# Patient Record
Sex: MALE | Race: WHITE | NOT HISPANIC OR LATINO | URBAN - METROPOLITAN AREA
[De-identification: names, ages, dates, MRNs, and addresses within clinical notes are randomized per-mention and may not be internally consistent; named-entity substitution may affect disease eponyms.]

---

## 2018-02-16 ENCOUNTER — EMERGENCY (EMERGENCY)
Facility: HOSPITAL | Age: 73
LOS: 1 days | End: 2018-02-16
Payer: MEDICARE

## 2018-02-16 PROCEDURE — 71045 X-RAY EXAM CHEST 1 VIEW: CPT | Mod: 26

## 2018-02-16 PROCEDURE — 99285 EMERGENCY DEPT VISIT HI MDM: CPT

## 2018-02-17 PROCEDURE — 74177 CT ABD & PELVIS W/CONTRAST: CPT | Mod: 26

## 2018-02-17 PROCEDURE — 76705 ECHO EXAM OF ABDOMEN: CPT | Mod: 26

## 2018-04-09 ENCOUNTER — TRANSCRIPTION ENCOUNTER (OUTPATIENT)
Age: 73
End: 2018-04-09

## 2018-05-11 ENCOUNTER — OUTPATIENT (OUTPATIENT)
Dept: OUTPATIENT SERVICES | Facility: HOSPITAL | Age: 73
LOS: 1 days | End: 2018-05-11
Payer: MEDICARE

## 2018-05-11 PROCEDURE — 76700 US EXAM ABDOM COMPLETE: CPT | Mod: 26

## 2018-09-07 ENCOUNTER — EMERGENCY (EMERGENCY)
Facility: HOSPITAL | Age: 73
LOS: 1 days | End: 2018-09-07
Payer: MEDICARE

## 2018-09-07 PROCEDURE — 70450 CT HEAD/BRAIN W/O DYE: CPT | Mod: 26

## 2018-09-07 PROCEDURE — 99285 EMERGENCY DEPT VISIT HI MDM: CPT

## 2018-09-07 PROCEDURE — 71045 X-RAY EXAM CHEST 1 VIEW: CPT | Mod: 26

## 2018-09-26 ENCOUNTER — APPOINTMENT (OUTPATIENT)
Dept: CARDIOLOGY | Facility: CLINIC | Age: 73
End: 2018-09-26
Payer: MEDICARE

## 2018-09-26 VITALS
SYSTOLIC BLOOD PRESSURE: 132 MMHG | DIASTOLIC BLOOD PRESSURE: 70 MMHG | WEIGHT: 230 LBS | HEART RATE: 90 BPM | HEIGHT: 70 IN | OXYGEN SATURATION: 98 % | BODY MASS INDEX: 32.93 KG/M2

## 2018-09-26 DIAGNOSIS — Z87.891 PERSONAL HISTORY OF NICOTINE DEPENDENCE: ICD-10-CM

## 2018-09-26 DIAGNOSIS — Z82.49 FAMILY HISTORY OF ISCHEMIC HEART DISEASE AND OTHER DISEASES OF THE CIRCULATORY SYSTEM: ICD-10-CM

## 2018-09-26 DIAGNOSIS — K29.70 GASTRITIS, UNSPECIFIED, W/OUT BLEEDING: ICD-10-CM

## 2018-09-26 DIAGNOSIS — Z78.9 OTHER SPECIFIED HEALTH STATUS: ICD-10-CM

## 2018-09-26 DIAGNOSIS — R52 PAIN, UNSPECIFIED: ICD-10-CM

## 2018-09-26 DIAGNOSIS — Z00.00 ENCOUNTER FOR GENERAL ADULT MEDICAL EXAMINATION W/OUT ABNORMAL FINDINGS: ICD-10-CM

## 2018-09-26 PROCEDURE — 99205 OFFICE O/P NEW HI 60 MIN: CPT

## 2018-09-26 RX ORDER — DICYCLOMINE HYDROCHLORIDE 10 MG/1
10 CAPSULE ORAL DAILY
Refills: 0 | Status: ACTIVE | COMMUNITY

## 2018-10-24 ENCOUNTER — OUTPATIENT (OUTPATIENT)
Dept: OUTPATIENT SERVICES | Facility: HOSPITAL | Age: 73
LOS: 1 days | End: 2018-10-24

## 2021-09-15 ENCOUNTER — APPOINTMENT (OUTPATIENT)
Dept: CT IMAGING | Facility: CLINIC | Age: 76
End: 2021-09-15
Payer: MEDICARE

## 2021-09-15 PROCEDURE — 71250 CT THORAX DX C-: CPT | Mod: MH

## 2022-03-17 ENCOUNTER — OUTPATIENT (OUTPATIENT)
Dept: OUTPATIENT SERVICES | Facility: HOSPITAL | Age: 77
LOS: 1 days | End: 2022-03-17

## 2022-03-17 DIAGNOSIS — E78.5 HYPERLIPIDEMIA, UNSPECIFIED: ICD-10-CM

## 2022-03-17 DIAGNOSIS — R53.83 OTHER FATIGUE: ICD-10-CM

## 2022-03-17 DIAGNOSIS — Z13.1 ENCOUNTER FOR SCREENING FOR DIABETES MELLITUS: ICD-10-CM

## 2022-03-29 ENCOUNTER — APPOINTMENT (OUTPATIENT)
Dept: CARDIOLOGY | Facility: CLINIC | Age: 77
End: 2022-03-29
Payer: MEDICARE

## 2022-03-29 VITALS
OXYGEN SATURATION: 99 % | SYSTOLIC BLOOD PRESSURE: 130 MMHG | HEIGHT: 70 IN | WEIGHT: 228 LBS | TEMPERATURE: 97.3 F | HEART RATE: 67 BPM | DIASTOLIC BLOOD PRESSURE: 68 MMHG | BODY MASS INDEX: 32.64 KG/M2

## 2022-03-29 DIAGNOSIS — R07.89 OTHER CHEST PAIN: ICD-10-CM

## 2022-03-29 DIAGNOSIS — M79.603 PAIN IN ARM, UNSPECIFIED: ICD-10-CM

## 2022-03-29 DIAGNOSIS — R06.02 SHORTNESS OF BREATH: ICD-10-CM

## 2022-03-29 PROCEDURE — 93000 ELECTROCARDIOGRAM COMPLETE: CPT

## 2022-03-29 PROCEDURE — 99204 OFFICE O/P NEW MOD 45 MIN: CPT

## 2022-03-29 NOTE — REASON FOR VISIT
[FreeTextEntry1] : The patient is seen because of shortness of breath.  The patient has a history of bladder cancer as well as significant COPD.  The patient smoked for approximately 120 pack years.  He mostly uses a wheelchair but he can walk short distances.  He is dyspneic with minimal exertion.  The patient has been told of significant asthma in the past.  The patient has a chest discomfort syndrome.  He cannot describe it.  There is no radiation.  It last for a few minutes and then resolves.  Is been going on for about 2 years without change.

## 2022-03-29 NOTE — ASSESSMENT
[FreeTextEntry1] : COPD/shortness of breath/chest discomfort: We will obtain echocardiography.  After the results of echocardiography are available we will review the risks and benefits of invasive coronary angiography.  Given the nearly wheelchair-bound status, advanced age, and significant asthma overall do not think that stress testing is his best option.  We will decide on further management after transthoracic echocardiography.

## 2022-04-04 PROBLEM — M79.603 ARM PAIN: Status: ACTIVE | Noted: 2022-03-29

## 2022-04-04 PROBLEM — R07.89 BURNING IN THE CHEST: Status: ACTIVE | Noted: 2022-03-29

## 2022-04-04 PROBLEM — R06.02 SHORTNESS OF BREATH ON EXERTION: Status: ACTIVE | Noted: 2022-03-29

## 2022-04-18 ENCOUNTER — RESULT CHARGE (OUTPATIENT)
Age: 77
End: 2022-04-18

## 2022-04-19 ENCOUNTER — APPOINTMENT (OUTPATIENT)
Dept: CARDIOLOGY | Facility: CLINIC | Age: 77
End: 2022-04-19
Payer: MEDICARE

## 2022-04-19 ENCOUNTER — NON-APPOINTMENT (OUTPATIENT)
Age: 77
End: 2022-04-19

## 2022-04-19 VITALS
OXYGEN SATURATION: 94 % | HEIGHT: 70 IN | TEMPERATURE: 97 F | HEART RATE: 104 BPM | DIASTOLIC BLOOD PRESSURE: 70 MMHG | SYSTOLIC BLOOD PRESSURE: 120 MMHG | WEIGHT: 228 LBS | BODY MASS INDEX: 32.64 KG/M2

## 2022-04-19 DIAGNOSIS — R06.00 DYSPNEA, UNSPECIFIED: ICD-10-CM

## 2022-04-19 DIAGNOSIS — R00.0 TACHYCARDIA, UNSPECIFIED: ICD-10-CM

## 2022-04-19 PROCEDURE — 93306 TTE W/DOPPLER COMPLETE: CPT

## 2022-04-19 PROCEDURE — 99213 OFFICE O/P EST LOW 20 MIN: CPT

## 2022-04-19 NOTE — HISTORY OF PRESENT ILLNESS
[FreeTextEntry1] : Shortness of breath /COPD: The patient is approximately 120-year pack year cigarette smoking history.  He is short of breath.  He can only walk minimal distances.  He mostly uses a wheelchair.  He has also been told of significant asthma.  Echocardiography reveals a hyperdynamic LV.  The risk and benefit of invasive evaluation have been reviewed.  Patient does not wish to undergo any further procedures.  \par \par Tachycardia: The patient is in sinus tachycardia.  Most likely this is from severe underlying lung disease.  Continued observation is his best option.\par \par CBC and TSH were normal about a month ago.

## 2022-11-11 ENCOUNTER — NON-APPOINTMENT (OUTPATIENT)
Age: 77
End: 2022-11-11

## 2022-11-11 ENCOUNTER — APPOINTMENT (OUTPATIENT)
Dept: OPHTHALMOLOGY | Facility: CLINIC | Age: 77
End: 2022-11-11
Payer: MEDICARE

## 2022-11-11 PROCEDURE — 92002 INTRM OPH EXAM NEW PATIENT: CPT

## 2022-12-15 ENCOUNTER — NON-APPOINTMENT (OUTPATIENT)
Age: 77
End: 2022-12-15

## 2022-12-15 ENCOUNTER — APPOINTMENT (OUTPATIENT)
Dept: OPHTHALMOLOGY | Facility: CLINIC | Age: 77
End: 2022-12-15

## 2023-01-11 ENCOUNTER — NON-APPOINTMENT (OUTPATIENT)
Age: 78
End: 2023-01-11

## 2023-01-11 ENCOUNTER — APPOINTMENT (OUTPATIENT)
Dept: OPHTHALMOLOGY | Facility: CLINIC | Age: 78
End: 2023-01-11
Payer: MEDICARE

## 2023-01-11 PROCEDURE — 99203 OFFICE O/P NEW LOW 30 MIN: CPT

## 2023-02-16 ENCOUNTER — APPOINTMENT (OUTPATIENT)
Dept: OPHTHALMOLOGY | Facility: CLINIC | Age: 78
End: 2023-02-16
Payer: MEDICARE

## 2023-02-16 ENCOUNTER — NON-APPOINTMENT (OUTPATIENT)
Age: 78
End: 2023-02-16

## 2023-02-16 PROCEDURE — 92014 COMPRE OPH EXAM EST PT 1/>: CPT

## 2023-04-18 ENCOUNTER — APPOINTMENT (OUTPATIENT)
Dept: CT IMAGING | Facility: CLINIC | Age: 78
End: 2023-04-18
Payer: MEDICARE

## 2023-04-18 PROCEDURE — 71250 CT THORAX DX C-: CPT | Mod: MH

## 2023-05-26 ENCOUNTER — APPOINTMENT (OUTPATIENT)
Dept: CARDIOLOGY | Facility: CLINIC | Age: 78
End: 2023-05-26
Payer: MEDICARE

## 2023-05-26 VITALS
HEART RATE: 92 BPM | DIASTOLIC BLOOD PRESSURE: 70 MMHG | WEIGHT: 230 LBS | OXYGEN SATURATION: 96 % | HEIGHT: 70 IN | SYSTOLIC BLOOD PRESSURE: 146 MMHG | BODY MASS INDEX: 32.93 KG/M2

## 2023-05-26 PROCEDURE — 99213 OFFICE O/P EST LOW 20 MIN: CPT

## 2023-05-26 RX ORDER — ESOMEPRAZOLE MAGNESIUM 20 MG/1
20 GRANULE, DELAYED RELEASE ORAL TWICE DAILY
Refills: 0 | Status: DISCONTINUED | COMMUNITY
End: 2023-05-26

## 2023-05-26 RX ORDER — UMECLIDINIUM BROMIDE AND VILANTEROL TRIFENATATE 62.5; 25 UG/1; UG/1
62.5-25 POWDER RESPIRATORY (INHALATION)
Refills: 0 | Status: DISCONTINUED | COMMUNITY
End: 2023-05-26

## 2023-05-26 NOTE — ASSESSMENT
[FreeTextEntry1] : Ankle edema: The patient was advised to cardiology because of ankle edema.  Is been going on for about 3 years without change.  There is no pain.  It is bilateral nature.  The patient is mostly in a wheelchair.  The patient suffers from dependent edema.  Noninvasive measures have been reviewed.  Is not bothersome to him overall we have elected to withhold diuretic therapy at this time but if it is uncomfortable for him we will add diuretic therapy.\par \par COPD: The patient is on home oxygen.  The patient exhibits pursed lip breathing.  The risk and benefits of further cardiac evaluation have been reviewed.  Overall the patient does not wish to pursue cardiac evaluation at this time.

## 2023-05-30 ENCOUNTER — APPOINTMENT (OUTPATIENT)
Dept: ULTRASOUND IMAGING | Facility: CLINIC | Age: 78
End: 2023-05-30
Payer: MEDICARE

## 2023-05-30 PROCEDURE — 93970 EXTREMITY STUDY: CPT

## 2023-08-07 ENCOUNTER — APPOINTMENT (OUTPATIENT)
Dept: RADIOLOGY | Facility: CLINIC | Age: 78
End: 2023-08-07
Payer: MEDICARE

## 2023-08-07 ENCOUNTER — APPOINTMENT (OUTPATIENT)
Dept: ULTRASOUND IMAGING | Facility: CLINIC | Age: 78
End: 2023-08-07

## 2023-08-07 PROCEDURE — 73030 X-RAY EXAM OF SHOULDER: CPT | Mod: RT

## 2023-08-07 PROCEDURE — 76870 US EXAM SCROTUM: CPT

## 2023-11-22 ENCOUNTER — APPOINTMENT (OUTPATIENT)
Dept: CARDIOLOGY | Facility: CLINIC | Age: 78
End: 2023-11-22
Payer: MEDICARE

## 2023-11-22 VITALS
WEIGHT: 230 LBS | BODY MASS INDEX: 33 KG/M2 | SYSTOLIC BLOOD PRESSURE: 126 MMHG | DIASTOLIC BLOOD PRESSURE: 56 MMHG | HEART RATE: 79 BPM | OXYGEN SATURATION: 97 %

## 2023-11-22 DIAGNOSIS — M25.473 EFFUSION, UNSPECIFIED ANKLE: ICD-10-CM

## 2023-11-22 PROCEDURE — 99212 OFFICE O/P EST SF 10 MIN: CPT

## 2024-01-05 ENCOUNTER — APPOINTMENT (OUTPATIENT)
Dept: CARDIOLOGY | Facility: CLINIC | Age: 79
End: 2024-01-05
Payer: MEDICARE

## 2024-01-05 ENCOUNTER — NON-APPOINTMENT (OUTPATIENT)
Age: 79
End: 2024-01-05

## 2024-01-05 VITALS
SYSTOLIC BLOOD PRESSURE: 112 MMHG | DIASTOLIC BLOOD PRESSURE: 58 MMHG | WEIGHT: 230 LBS | HEART RATE: 75 BPM | HEIGHT: 70 IN | BODY MASS INDEX: 32.93 KG/M2 | OXYGEN SATURATION: 95 %

## 2024-01-05 PROCEDURE — 99215 OFFICE O/P EST HI 40 MIN: CPT

## 2024-01-05 RX ORDER — CHOLECALCIFEROL (VITAMIN D3) 125 MCG
TABLET ORAL
Refills: 0 | Status: ACTIVE | COMMUNITY

## 2024-01-05 RX ORDER — ASPIRIN 81 MG
81 TABLET, DELAYED RELEASE (ENTERIC COATED) ORAL
Refills: 0 | Status: ACTIVE | COMMUNITY

## 2024-01-05 RX ORDER — OMEPRAZOLE 40 MG/1
40 CAPSULE, DELAYED RELEASE ORAL
Refills: 0 | Status: ACTIVE | COMMUNITY

## 2024-01-05 RX ORDER — ROSUVASTATIN CALCIUM 20 MG/1
20 TABLET, FILM COATED ORAL
Refills: 0 | Status: ACTIVE | COMMUNITY

## 2024-01-05 NOTE — REASON FOR VISIT
[FreeTextEntry1] :    The patient is seen because of chest pain.  The patient has a history of 50 years of smoking 2-1/2 packs a day.  He discontinued cigarettes several years ago.  The patient is mostly wheelchair-bound.  He has severe COPD.  He is frequently dyspneic.  About 2 weeks ago the patient developed a chest pain syndrome.  Describes an uncomfortable feeling in the left side of his chest.  It moved around to the right side of his chest and now is back to the left side of his chest.  It is nearly continuous in nature.  He believes his breathing may have gotten slightly worse recently.

## 2024-01-05 NOTE — ASSESSMENT
[FreeTextEntry1] :   The patient has acute chest pain syndrome consistent with an acute ischemic syndrome.  This acute illness poses threat to life.    Decision regarding hospitalization: I have recommended emergency room evaluation to rule out myocardial infarction.  The patient already is on aspirin therapy.  The patient has a history of significant asthma.  He has been asthmatic since he was a child.  He is already on aspirin therapy.  Will withhold beta-blocker therapy.  The patient has been advised to report to the hospital as soon as he is agreeable.  He is at this time and not agreeable to hospitalization.  Transthoracic echocardiography has been arranged to rule out pericardial disease.  The risk and benefits of invasive coronary angiography have been reviewed.  The patient does not wish to proceed with invasive coronary angiography.

## 2024-01-11 ENCOUNTER — APPOINTMENT (OUTPATIENT)
Dept: CARDIOLOGY | Facility: CLINIC | Age: 79
End: 2024-01-11
Payer: MEDICARE

## 2024-01-11 PROCEDURE — 93306 TTE W/DOPPLER COMPLETE: CPT

## 2024-01-18 ENCOUNTER — APPOINTMENT (OUTPATIENT)
Dept: CARDIOLOGY | Facility: CLINIC | Age: 79
End: 2024-01-18
Payer: MEDICARE

## 2024-01-18 VITALS
DIASTOLIC BLOOD PRESSURE: 60 MMHG | BODY MASS INDEX: 32.93 KG/M2 | OXYGEN SATURATION: 95 % | WEIGHT: 230 LBS | HEIGHT: 70 IN | SYSTOLIC BLOOD PRESSURE: 124 MMHG

## 2024-01-18 DIAGNOSIS — R07.9 CHEST PAIN, UNSPECIFIED: ICD-10-CM

## 2024-01-18 DIAGNOSIS — Z86.39 PERSONAL HISTORY OF OTHER ENDOCRINE, NUTRITIONAL AND METABOLIC DISEASE: ICD-10-CM

## 2024-01-18 DIAGNOSIS — J44.9 CHRONIC OBSTRUCTIVE PULMONARY DISEASE, UNSPECIFIED: ICD-10-CM

## 2024-01-18 PROCEDURE — 99214 OFFICE O/P EST MOD 30 MIN: CPT

## 2024-01-18 RX ORDER — ALBUTEROL SULFATE 90 UG/1
108 (90 BASE) INHALANT RESPIRATORY (INHALATION)
Refills: 0 | Status: ACTIVE | COMMUNITY

## 2024-01-18 RX ORDER — ATORVASTATIN CALCIUM 10 MG/1
10 TABLET, FILM COATED ORAL
Qty: 90 | Refills: 3 | Status: ACTIVE | COMMUNITY
Start: 2024-01-18 | End: 1900-01-01

## 2024-01-18 RX ORDER — BUDESONIDE, GLYCOPYRROLATE, AND FORMOTEROL FUMARATE 160; 9; 4.8 UG/1; UG/1; UG/1
160-9-4.8 AEROSOL, METERED RESPIRATORY (INHALATION)
Refills: 0 | Status: ACTIVE | COMMUNITY

## 2024-01-18 NOTE — REASON FOR VISIT
[FreeTextEntry1] :   The patient is seen for follow-up of chest discomfort syndrome.  The patient self discontinued his rosuvastatin.  His chest discomfort and likely muscle pain completely resolved.  The patient is in a wheelchair.  There has been no further chest discomfort.  He has chronic shortness of breath to be secondary to COPD.

## 2024-01-18 NOTE — ASSESSMENT
[FreeTextEntry1] : Hyperlipidemia: The patient was on rosuvastatin.  He felt it may be giving him myalgias.  He discontinued rosuvastatin and his muscle pain including his chest discomfort resolved.  Overall we elected to attempt a atorvastatin 10 mg daily.  LFTs and cholesterol profile have been arranged in 6 weeks time.  COPD: Patient is a former long-term cigarette smoker.  He suffers from COPD.  It is possible that some of his chest discomfort was secondary to COPD.   Continue albuterol therapy.  Chest discomfort: No recurrence.  I have recommended continuation of baby aspirin therapy.  The patient has been instructed to call 911 for any chest discomfort lasting over 15 minutes.  The plan will be to consider invasive coronary angiography or CT coronary angiography if his symptoms recur.  The patient does not wish to undergo any further cardiac imaging at this time.

## 2024-01-24 ENCOUNTER — LABORATORY RESULT (OUTPATIENT)
Age: 79
End: 2024-01-24

## 2024-02-15 ENCOUNTER — APPOINTMENT (OUTPATIENT)
Dept: OPHTHALMOLOGY | Facility: CLINIC | Age: 79
End: 2024-02-15
Payer: MEDICARE

## 2024-02-15 ENCOUNTER — NON-APPOINTMENT (OUTPATIENT)
Age: 79
End: 2024-02-15

## 2024-02-15 PROCEDURE — 92014 COMPRE OPH EXAM EST PT 1/>: CPT

## 2024-06-25 ENCOUNTER — APPOINTMENT (OUTPATIENT)
Dept: UROLOGY | Facility: CLINIC | Age: 79
End: 2024-06-25
Payer: MEDICARE

## 2024-06-25 VITALS
WEIGHT: 230 LBS | SYSTOLIC BLOOD PRESSURE: 176 MMHG | HEIGHT: 70 IN | TEMPERATURE: 98.2 F | BODY MASS INDEX: 32.93 KG/M2 | HEART RATE: 116 BPM | DIASTOLIC BLOOD PRESSURE: 89 MMHG

## 2024-06-25 DIAGNOSIS — N39.0 URINARY TRACT INFECTION, SITE NOT SPECIFIED: ICD-10-CM

## 2024-06-25 PROCEDURE — 99203 OFFICE O/P NEW LOW 30 MIN: CPT

## 2024-07-15 ENCOUNTER — APPOINTMENT (OUTPATIENT)
Dept: CARDIOLOGY | Facility: CLINIC | Age: 79
End: 2024-07-15
Payer: MEDICARE

## 2024-07-15 VITALS — HEART RATE: 93 BPM | SYSTOLIC BLOOD PRESSURE: 134 MMHG | OXYGEN SATURATION: 94 % | DIASTOLIC BLOOD PRESSURE: 58 MMHG

## 2024-07-15 DIAGNOSIS — R07.9 CHEST PAIN, UNSPECIFIED: ICD-10-CM

## 2024-07-15 PROCEDURE — 99214 OFFICE O/P EST MOD 30 MIN: CPT

## 2024-08-01 ENCOUNTER — APPOINTMENT (OUTPATIENT)
Dept: UROLOGY | Facility: CLINIC | Age: 79
End: 2024-08-01
Payer: MEDICARE

## 2024-08-01 VITALS
HEART RATE: 103 BPM | BODY MASS INDEX: 32.93 KG/M2 | SYSTOLIC BLOOD PRESSURE: 151 MMHG | DIASTOLIC BLOOD PRESSURE: 72 MMHG | WEIGHT: 230 LBS | TEMPERATURE: 98.4 F | HEIGHT: 70 IN

## 2024-08-01 DIAGNOSIS — N39.0 URINARY TRACT INFECTION, SITE NOT SPECIFIED: ICD-10-CM

## 2024-08-01 PROCEDURE — 99212 OFFICE O/P EST SF 10 MIN: CPT

## 2024-08-01 NOTE — LETTER BODY
[Dear  ___] : Dear  [unfilled], [Consult Letter:] : I had the pleasure of evaluating your patient, [unfilled]. [Please see my note below.] : Please see my note below. [Consult Closing:] : Thank you very much for allowing me to participate in the care of this patient.  If you have any questions, please do not hesitate to contact me. [Sincerely,] : Sincerely, [FreeTextEntry3] : Irvin Sandhu, DO Genitourinary Medicine

## 2024-08-01 NOTE — ASSESSMENT
[FreeTextEntry1] : Plan fu 3 months of proton pump inhibiter therapy. He was explained about the possibility of calcium and magnesium malabsorption and was advised to start taking calcium supplements with Vit D. Some over the counter regimens were explained to patient. Some dietary advices were also given. He has verbalized understanding and agreement to this. Pt was advised in detail about some life style and dietary modifications. He was advised about avoidance of caffeine, nicotine and chocolate. Pt was also told to stay away from any kind of fast foods, soda pops. He was also advised to avoid lots of spices, grease and fried food etc.     Instructions were also given about trying to arrange the timing, quality and quantity of food. Instructions were given about using ample amount of fiber including dietary and supplemental fiber either metamucil, bennafiber or citrucell etc.  Pt was advised about drinking ample amount of water without any colors or chemicals. Stress was given about regular exercise. Pt has verbalized understanding and agreement to these modifications. Thank you for allowing me to participate in the care of Mr. Rosanna Tony. For any further questions please do not hesitate to contact me. I have reviewed and agree with the ROS entered by the MA/Nurse. This note is created with the assistance of the speech recognition program.  While intending to generate a document that actually reflects the content of the visit, document can still have some errors including those of syntax and sound like substitutions which may escape proof reading. Actual meaning can be extrapolated by contextual diversion.      Messi Grewal MD, [facility]  Board Certified in Gastroenterology and [address] [facility]  Office #: [phone]

## 2024-11-07 ENCOUNTER — APPOINTMENT (OUTPATIENT)
Dept: UROLOGY | Facility: CLINIC | Age: 79
End: 2024-11-07

## 2024-11-11 ENCOUNTER — APPOINTMENT (OUTPATIENT)
Dept: CARDIOLOGY | Facility: CLINIC | Age: 79
End: 2024-11-11

## 2024-11-21 ENCOUNTER — APPOINTMENT (OUTPATIENT)
Dept: CARDIOLOGY | Facility: CLINIC | Age: 79
End: 2024-11-21
Payer: MEDICARE

## 2024-11-21 VITALS
BODY MASS INDEX: 32.93 KG/M2 | WEIGHT: 230 LBS | DIASTOLIC BLOOD PRESSURE: 66 MMHG | HEART RATE: 98 BPM | HEIGHT: 70 IN | OXYGEN SATURATION: 95 % | SYSTOLIC BLOOD PRESSURE: 144 MMHG

## 2024-11-21 DIAGNOSIS — R07.9 CHEST PAIN, UNSPECIFIED: ICD-10-CM

## 2024-11-21 PROCEDURE — 99212 OFFICE O/P EST SF 10 MIN: CPT

## 2024-11-26 ENCOUNTER — APPOINTMENT (OUTPATIENT)
Dept: UROLOGY | Facility: CLINIC | Age: 79
End: 2024-11-26
Payer: MEDICARE

## 2024-11-26 VITALS
SYSTOLIC BLOOD PRESSURE: 101 MMHG | DIASTOLIC BLOOD PRESSURE: 59 MMHG | HEIGHT: 70 IN | TEMPERATURE: 97.3 F | WEIGHT: 230 LBS | HEART RATE: 109 BPM | BODY MASS INDEX: 32.93 KG/M2

## 2024-11-26 DIAGNOSIS — R39.9 UNSPECIFIED SYMPTOMS AND SIGNS INVOLVING THE GENITOURINARY SYSTEM: ICD-10-CM

## 2024-11-26 PROCEDURE — 99212 OFFICE O/P EST SF 10 MIN: CPT

## 2024-12-19 ENCOUNTER — NON-APPOINTMENT (OUTPATIENT)
Age: 79
End: 2024-12-19

## 2024-12-19 ENCOUNTER — APPOINTMENT (OUTPATIENT)
Dept: OPHTHALMOLOGY | Facility: CLINIC | Age: 79
End: 2024-12-19
Payer: MEDICARE

## 2024-12-19 PROCEDURE — 99213 OFFICE O/P EST LOW 20 MIN: CPT

## 2025-02-20 ENCOUNTER — NON-APPOINTMENT (OUTPATIENT)
Age: 80
End: 2025-02-20

## 2025-02-20 ENCOUNTER — APPOINTMENT (OUTPATIENT)
Dept: OPHTHALMOLOGY | Facility: CLINIC | Age: 80
End: 2025-02-20
Payer: MEDICARE

## 2025-02-20 PROCEDURE — 92014 COMPRE OPH EXAM EST PT 1/>: CPT

## 2025-05-08 ENCOUNTER — APPOINTMENT (OUTPATIENT)
Dept: CARDIOLOGY | Facility: CLINIC | Age: 80
End: 2025-05-08
Payer: MEDICARE

## 2025-05-08 ENCOUNTER — NON-APPOINTMENT (OUTPATIENT)
Age: 80
End: 2025-05-08

## 2025-05-08 VITALS
DIASTOLIC BLOOD PRESSURE: 80 MMHG | SYSTOLIC BLOOD PRESSURE: 138 MMHG | HEIGHT: 70 IN | BODY MASS INDEX: 32.93 KG/M2 | WEIGHT: 230 LBS | HEART RATE: 85 BPM | OXYGEN SATURATION: 98 %

## 2025-05-08 DIAGNOSIS — Z13.6 ENCOUNTER FOR SCREENING FOR CARDIOVASCULAR DISORDERS: ICD-10-CM

## 2025-05-08 DIAGNOSIS — R06.02 SHORTNESS OF BREATH: ICD-10-CM

## 2025-05-08 DIAGNOSIS — E78.00 PURE HYPERCHOLESTEROLEMIA, UNSPECIFIED: ICD-10-CM

## 2025-05-08 PROCEDURE — G2211 COMPLEX E/M VISIT ADD ON: CPT

## 2025-05-08 PROCEDURE — 99204 OFFICE O/P NEW MOD 45 MIN: CPT

## 2025-05-08 PROCEDURE — 93000 ELECTROCARDIOGRAM COMPLETE: CPT

## 2025-05-09 LAB — HBA1C MFR BLD HPLC: 5.3

## 2025-05-20 ENCOUNTER — APPOINTMENT (OUTPATIENT)
Dept: UROLOGY | Facility: CLINIC | Age: 80
End: 2025-05-20
Payer: MEDICARE

## 2025-05-20 VITALS
DIASTOLIC BLOOD PRESSURE: 78 MMHG | HEIGHT: 70 IN | SYSTOLIC BLOOD PRESSURE: 152 MMHG | WEIGHT: 230 LBS | TEMPERATURE: 97.3 F | BODY MASS INDEX: 32.93 KG/M2 | HEART RATE: 102 BPM

## 2025-05-20 DIAGNOSIS — R39.9 UNSPECIFIED SYMPTOMS AND SIGNS INVOLVING THE GENITOURINARY SYSTEM: ICD-10-CM

## 2025-05-20 PROCEDURE — 99213 OFFICE O/P EST LOW 20 MIN: CPT
